# Patient Record
Sex: MALE | Race: WHITE | NOT HISPANIC OR LATINO | Employment: FULL TIME | URBAN - METROPOLITAN AREA
[De-identification: names, ages, dates, MRNs, and addresses within clinical notes are randomized per-mention and may not be internally consistent; named-entity substitution may affect disease eponyms.]

---

## 2024-05-01 ENCOUNTER — OFFICE VISIT (OUTPATIENT)
Dept: PRIMARY CARE CLINIC | Facility: CLINIC | Age: 24
End: 2024-05-01
Payer: COMMERCIAL

## 2024-05-01 VITALS
DIASTOLIC BLOOD PRESSURE: 79 MMHG | HEART RATE: 64 BPM | HEIGHT: 75 IN | OXYGEN SATURATION: 99 % | BODY MASS INDEX: 31.19 KG/M2 | WEIGHT: 250.88 LBS | SYSTOLIC BLOOD PRESSURE: 129 MMHG

## 2024-05-01 DIAGNOSIS — R79.89 ABNORMAL CBC: Primary | ICD-10-CM

## 2024-05-01 LAB
APTT PPP: 34.8 SEC (ref 25.2–38.7)
BASOPHILS NFR BLD: 0.7 % (ref 0–3)
EOSINOPHIL NFR BLD: 4.1 % (ref 1–3)
ERYTHROCYTE [DISTWIDTH] IN BLOOD BY AUTOMATED COUNT: 13.1 % (ref 12.5–18)
HCT VFR BLD AUTO: 47 % (ref 42–52)
HGB BLD-MCNC: 15.5 G/DL (ref 14–18)
INR PPP: 1 INR (ref 0.9–1.1)
LYMPHOCYTES NFR BLD: 23.1 % (ref 25–40)
MCH RBC QN AUTO: 30.2 PG (ref 27–31.2)
MCHC RBC AUTO-ENTMCNC: 33 G/DL (ref 31.8–35.4)
MCV RBC AUTO: 91.6 FL (ref 80–97)
MONOCYTES NFR BLD: 5.1 % (ref 1–15)
NEUTROPHILS # BLD AUTO: 3.78 10*3/UL (ref 1.8–7.7)
NEUTROPHILS NFR BLD: 66.6 % (ref 37–80)
NUCLEATED RED BLOOD CELLS: 0 %
PLATELETS: 417 10*3/UL (ref 142–424)
PROTHROMBIN TIME: 11.8 SEC (ref 10.4–13.2)
RBC # BLD AUTO: 5.13 10*6/UL (ref 4.7–6.1)
WBC # BLD: 5.7 10*3/UL (ref 4.6–10.2)

## 2024-05-01 PROCEDURE — 99203 OFFICE O/P NEW LOW 30 MIN: CPT | Mod: S$GLB,,, | Performed by: INTERNAL MEDICINE

## 2024-05-01 PROCEDURE — 3074F SYST BP LT 130 MM HG: CPT | Mod: CPTII,S$GLB,, | Performed by: INTERNAL MEDICINE

## 2024-05-01 PROCEDURE — 3008F BODY MASS INDEX DOCD: CPT | Mod: CPTII,S$GLB,, | Performed by: INTERNAL MEDICINE

## 2024-05-01 PROCEDURE — 3078F DIAST BP <80 MM HG: CPT | Mod: CPTII,S$GLB,, | Performed by: INTERNAL MEDICINE

## 2024-05-01 NOTE — LETTER
May 1, 2024      [facility]  [address] [place] [address], [address]  [facility] [address]  Phone: [phone]  Fax: [phone]       Patient: Jamar Brown   YOB: 2000  Date of Visit: 05/01/2024    To Whom It May Concern:    Sharath Brown  was at [facility] on 05/01/2024. The patient may return to work/school on 05/02/24 without restrictions. If you have any questions or concerns, or if I can be of further assistance, please do not hesitate to contact me.    Sincerely,    Noelle Montelongo MA

## 2024-05-01 NOTE — PROGRESS NOTES
Subjective:      Patient ID: Jamar Brown is a 23 y.o. male.    Chief Complaint: Establish Care and Results (The patient is here to discuss a lab result from his job. Platelet count 538. )    HPI    Past Medical History:   Diagnosis Date    Autism     Gingivitis     Hemophilia        Past Surgical History:   Procedure Laterality Date    WISDOM TOOTH EXTRACTION           Family History   Problem Relation Name Age of Onset    Hyperlipidemia Mother      Bipolar disorder Mother      No Known Problems Father           Social History     Socioeconomic History    Marital status: Significant Other   Tobacco Use    Smoking status: Never    Smokeless tobacco: Never   Substance and Sexual Activity    Alcohol use: Yes     Comment: OCC    Drug use: Never         Review of Systems   Constitutional:  Negative for chills and fever.   HENT:  Negative for hearing loss.    Eyes:  Negative for blurred vision.   Respiratory:  Negative for cough, shortness of breath and wheezing.    Cardiovascular:  Negative for chest pain, palpitations and leg swelling.   Gastrointestinal:  Negative for abdominal pain, blood in stool, constipation, diarrhea, melena, nausea and vomiting.   Genitourinary:  Negative for dysuria, frequency and urgency.   Musculoskeletal:  Negative for falls.   Skin:  Negative for rash.   Neurological:  Negative for dizziness and headaches.   Endo/Heme/Allergies:  Does not bruise/bleed easily.   Psychiatric/Behavioral:  Negative for depression. The patient is not nervous/anxious.      Objective:     Physical Exam  Vitals reviewed.   Constitutional:       Appearance: Normal appearance.   HENT:      Head: Normocephalic.      Mouth/Throat:      Mouth: Mucous membranes are moist.      Pharynx: Oropharynx is clear.   Eyes:      Extraocular Movements: Extraocular movements intact.      Conjunctiva/sclera: Conjunctivae normal.      Pupils: Pupils are equal, round, and reactive to light.   Cardiovascular:      Rate and Rhythm:  "Normal rate and regular rhythm.   Pulmonary:      Effort: Pulmonary effort is normal.      Breath sounds: Normal breath sounds.   Abdominal:      General: Bowel sounds are normal.   Musculoskeletal:      Right lower leg: No edema.      Left lower leg: No edema.   Skin:     General: Skin is warm.      Capillary Refill: Capillary refill takes less than 2 seconds.   Neurological:      Mental Status: He is alert and oriented to person, place, and time.   Psychiatric:         Mood and Affect: Mood normal.       Assessment:       ICD-10-CM ICD-9-CM   1. Abnormal CBC  R79.89 790.6       Plan:          1. Abnormal CBC  -     CBC Auto Differential; Future; Expected date: 05/01/2024  -     Protime-INR; Future; Expected date: 05/01/2024  -     APTT; Future; Expected date: 05/01/2024         Patient states his mother labelled him as "free bleeder"   He denies getting any factors, special blood products when getting surgery   He denies family history of bleeding disorders  I advised to get records form his prior PCP/whoever diagnosed him with Hemophilia  Labs reviewed and all wnl      RTC PRN                 "

## 2024-05-03 ENCOUNTER — TELEPHONE (OUTPATIENT)
Dept: PRIMARY CARE CLINIC | Facility: CLINIC | Age: 24
End: 2024-05-03
Payer: COMMERCIAL

## 2024-05-03 ENCOUNTER — PATIENT MESSAGE (OUTPATIENT)
Dept: PRIMARY CARE CLINIC | Facility: CLINIC | Age: 24
End: 2024-05-03
Payer: COMMERCIAL

## 2024-05-03 NOTE — TELEPHONE ENCOUNTER
Kaela was advised and verbalized understanding.     please let him know, coagulation and platelets normal.